# Patient Record
(demographics unavailable — no encounter records)

---

## 2025-01-22 NOTE — REVIEW OF SYSTEMS
[Chills] : chills [Fatigue] : fatigue [Chest Pain] : chest pain [Shortness Of Breath] : shortness of breath [Wheezing] : wheezing [Cough] : cough [Fever] : no fever [Discharge] : no discharge [Vision Problems] : no vision problems [Earache] : no earache [Abdominal Pain] : no abdominal pain [Vomiting] : no vomiting [Dysuria] : no dysuria [Hematuria] : no hematuria [Joint Pain] : no joint pain [Back Pain] : no back pain [Itching] : no itching [Skin Rash] : no skin rash [Headache] : no headache [Memory Loss] : no memory loss [Suicidal] : not suicidal

## 2025-01-22 NOTE — HISTORY OF PRESENT ILLNESS
[Spouse] : spouse [FreeTextEntry8] : 46 year old male presents with complaints of cough and congestion for almost 3 weeks. Today is the first time seeking medical attention.  He has occasional chest pain when he coughs.  He also has occasional sputum production.

## 2025-02-27 NOTE — ASSESSMENT
[FreeTextEntry1] : Obtained and reviewed pulmonary function test results with patient today. PFT was unremarkable.  Dr. Arana's assessment: Mahin's cough is likely secondary to postnasal drip/chronic sinusitis.   Dr. Arana's recommendation: At this point, DTA should start taking Augmentin and Prednisone taper for his cough/inflammation/possible sinusitis infection. Mahin should start taking Fluticasone nasal spray, two sprays in each nostril once daily, for his cough/postnasal drip. Return for pulmonary follow up in 1 week.

## 2025-02-27 NOTE — PROCEDURE
[FreeTextEntry1] : Pulmonary Function Test performed today, 02/26/2025, in my office: Spirometry: Within normal limits with some improvement postbronchodilator. Lung Volume: Within normal limits. Diffusion: Within normal limits.

## 2025-02-27 NOTE — SIGNATURES
[TextEntry] : This note was written by Cassidy Power on 02/26/2025 acting as medical scribe for Dr. Emily Arana. I, Dr. Emily Arana, have read and attest that all the information, medical decision making and discharge instructions within are true and accurate.

## 2025-02-27 NOTE — REASON FOR VISIT
[Initial] : an initial visit [Cough] : cough [Wheezing] : wheezing [Consultation] : a consultation [TextBox_13] : Dr. Golden Metzger

## 2025-02-27 NOTE — ASSESSMENT
[FreeTextEntry1] : Obtained and reviewed pulmonary function test results with patient today. PFT was unremarkable.  Dr. Arana's assessment: Mahin's cough is likely secondary to postnasal drip/chronic sinusitis.   Dr. Arana's recommendation: At this point, DAT should start taking Augmentin and Prednisone taper for his cough/inflammation/possible sinusitis infection. Mahin should start taking Fluticasone nasal spray, two sprays in each nostril once daily, for his cough/postnasal drip. Return for pulmonary follow up in 1 week.

## 2025-02-27 NOTE — CONSULT LETTER
[Dear  ___] : Dear  [unfilled], [Consult Letter:] : I had the pleasure of evaluating your patient, [unfilled]. [Please see my note below.] : Please see my note below. [Consult Closing:] : Thank you very much for allowing me to participate in the care of this patient.  If you have any questions, please do not hesitate to contact me. [Sincerely,] : Sincerely, [Courtesy Letter:] : I had the pleasure of seeing your patient, [unfilled], in my office today. [FreeTextEntry3] : Dr. Emily Arana

## 2025-02-27 NOTE — REVIEW OF SYSTEMS
[Cough] : cough [Chest Discomfort] : chest discomfort [Negative] : Endocrine [Wheezing] : wheezing [Postnasal Drip] : postnasal drip [Dyspnea] : no dyspnea [TextBox_14] : throat clearing

## 2025-02-27 NOTE — HISTORY OF PRESENT ILLNESS
[TextBox_4] : Mr. DAT RENNER is a 46 year-old male, without significant medical history, who presents for initial pulmonary evaluation. Patient presents via the kind courtesy of Dr. Golden Metzger, with regard to an ongoing cough after getting the flu about 2 months ago. Patient reports that he has throat clearing, along with wheezing at night, and his sleep is sometimes interrupted by his cough. He denies difficulty breathing; has some chest discomfort with cough.  Mahin denies a known history of asthma, or any other pulmonary diseases/dysfunction.  He is a non-smoker.

## 2025-03-04 NOTE — PLAN
[FreeTextEntry1] : Chronic cough - will refer to pulmonary Fatigue - will check labs Obesity - advised diet/ls modifications

## 2025-03-04 NOTE — HISTORY OF PRESENT ILLNESS
[FreeTextEntry1] : follow-up, cough [de-identified] : 46 year old male presents with complaints of a chronic cough.  He was already treated with azithromycin for URI recently but cough is still persistent.  He denies any fever, sputum production or chest pain.

## 2025-03-04 NOTE — HISTORY OF PRESENT ILLNESS
[FreeTextEntry1] : follow-up, cough [de-identified] : 46 year old male presents with complaints of a chronic cough.  He was already treated with azithromycin for URI recently but cough is still persistent.  He denies any fever, sputum production or chest pain.

## 2025-03-04 NOTE — REVIEW OF SYSTEMS
[Cough] : cough [Fever] : no fever [Night Sweats] : no night sweats [Discharge] : no discharge [Vision Problems] : no vision problems [Earache] : no earache [Nasal Discharge] : no nasal discharge [Chest Pain] : no chest pain [Orthopena] : no orthopnea [Shortness Of Breath] : no shortness of breath [Wheezing] : no wheezing [Abdominal Pain] : no abdominal pain [Vomiting] : no vomiting [Dysuria] : no dysuria [Hematuria] : no hematuria [Joint Pain] : no joint pain [Back Pain] : no back pain [Itching] : no itching [Skin Rash] : no skin rash [Headache] : no headache [Memory Loss] : no memory loss [Suicidal] : not suicidal [Easy Bleeding] : no easy bleeding

## 2025-03-20 NOTE — HISTORY OF PRESENT ILLNESS
[FreeTextEntry8] : 46 year old male presents with complaints of a cough that has been persistent for about 1 week.  The cough is dry and he reports he had influenza last week.  He denies any fever, chest pain or palpitations.

## 2025-03-20 NOTE — PLAN
[FreeTextEntry1] : Cough/URI - likely post viral, will start prednisone and advise symptomatic relief. Will check CXR.  Recent labs reviewed with patient as well and are stable.

## 2025-03-20 NOTE — REVIEW OF SYSTEMS
[Fever] : no fever [Night Sweats] : no night sweats [Discharge] : no discharge [Vision Problems] : no vision problems [Earache] : no earache [Nasal Discharge] : no nasal discharge [Chest Pain] : no chest pain [Orthopena] : no orthopnea [Shortness Of Breath] : no shortness of breath [Cough] : cough [Abdominal Pain] : no abdominal pain [Vomiting] : no vomiting [Dysuria] : no dysuria [Hematuria] : no hematuria [Joint Pain] : no joint pain [Back Pain] : no back pain [Itching] : no itching [Skin Rash] : no skin rash [Headache] : no headache [Memory Loss] : no memory loss [Suicidal] : not suicidal [Easy Bleeding] : no easy bleeding

## 2025-04-08 NOTE — ASSESSMENT
[FreeTextEntry1] : Obtained and reviewed Spirometry and NiOx results with patient today. Spirometry was improved compared to baseline and NiOx was unremarkable.  Dr. Arana's assessment: Mahin's cough is likely secondary to postnasal drip/bronchitis.   Dr. Arana's recommendation: At this point, Joann should start taking [id number] mcg/act, two puffs twice daily, for his cough/bronchitis. Joann should continue taking Fluticasone nasal spray, two sprays in each nostril once daily, for his cough/postnasal drip. Return for pulmonary follow up in 3 weeks.

## 2025-04-08 NOTE — ASSESSMENT
[FreeTextEntry1] : Obtained and reviewed Spirometry and NiOx results with patient today. Spirometry was improved compared to baseline and NiOx was unremarkable.  Dr. Arana's assessment: Mahin's cough is likely secondary to postnasal drip/bronchitis.   Dr. Arana's recommendation: At this point, Joann should start taking Klltbypbd274-4.5 mcg/act, two puffs twice daily, for his cough/bronchitis. Joann should continue taking Fluticasone nasal spray, two sprays in each nostril once daily, for his cough/postnasal drip. Return for pulmonary follow up in 3 weeks.

## 2025-04-08 NOTE — PROCEDURE
[FreeTextEntry1] : Pulmonary Function Test performed today, 04/08/2025, in my office: Spirometry: Within normal limits. ____________________________________________________________ Exhaled Nitric Oxide             Final  No Documents Attached    	Test  	Result  	Flag	Reference	Goal	Last Verified  	Exhaled Nitric Oxide	11	 	 		REQUIRED  Ordered by: QING RILEY       Collected/Examined: 08Apr2025 02:54PM       Verification Required       Stage: Final       Performed at: In Office       Performed by: QING RILEY       Resulted: 08Apr2025 02:54PM       Last Updated: 08Apr2025 02:58PM

## 2025-04-08 NOTE — HISTORY OF PRESENT ILLNESS
[TextBox_4] : Mr. JOANN RENNER is a 46 year-old male, with history of cough, postnasal drip, who presents to the office today for pulmonary follow up. Joann reports that his cough has improved but is still present and is now always productive of phlegm. He also reports that he still hears himself wheezing at times. Joann has been taking Fluticasone nasal spray as prescribed at the last visit for postnasal drip. He has has finished his course of Augmentin and Prednisone taper. He denies chest pain or shortness of breath.  Joann is a non-smoker.

## 2025-04-08 NOTE — SIGNATURES
[TextEntry] : This note was written by Cassidy Power on 04/08/2025 acting as medical scribe for Dr. Emily Arana. I, Dr. Emily Arana, have read and attest that all the information, medical decision making and discharge instructions within are true and accurate.